# Patient Record
(demographics unavailable — no encounter records)

---

## 2024-11-18 NOTE — HISTORY OF PRESENT ILLNESS
[___ Weeks Post Op] : [unfilled] weeks post op [3] : the patient reports pain that is 3/10 in severity [Doing Well] : is doing well [No Sign of Infection] : is showing no signs of infection [Adequate Pain Control] : has adequate pain control [Chills] : no chills [Constipation] : no constipation [Diarrhea] : no diarrhea [Dysuria] : no dysuria [Fever] : no fever [Nausea] : no nausea [Vomiting] : no vomiting [de-identified] : POS: Right total knee arthroplasty. Computer-assisted tibial resection, [employer]. DOS: 11/05/2024 [de-identified] : Patient is walking with a cane he has 2 more sessions of a home physical therapy left he is taking oxycodone 5 mg 3 times a day his pain is controlled [de-identified] :  right knee  exam shows healing incision with no sign of infection, ROM 3-95 degree . The surgical incision site(s) swollen, but clean, dry and intact, healed, not erythematous and not dehisced. Additional findings included an unremarkable neurological exam, peripheral vascular exam normal and maneuvers demonstrated a negative Josue's sign.   [de-identified] :  3V xray of the right knee done in the office today and reviewed and demonstrates s/p implants in good positioning with no evidence of wear, loosening, or subsidence.   [de-identified] : Patient will start out pt physical therapy and low impact exercise. continue elevated, ice, and pain management.   Continue DVTP medication.  continue posterior hip precaution.  follow-up in  6 weeks

## 2025-01-15 NOTE — HISTORY OF PRESENT ILLNESS
[Chills] : no chills [Constipation] : no constipation [Diarrhea] : no diarrhea [Dysuria] : no dysuria [Fever] : no fever [Nausea] : no nausea [Vomiting] : no vomiting [de-identified] : POS: Right total knee arthroplasty. Computer-assisted tibial resection, [employer]. DOS: 11/05/2024. [de-identified] :  Right knee exam shows healing incision with no sign of infection, ROM 0 to 120 degree. The surgical incision site(s) swollen, but clean, dry and intact, healed, not erythematous and not dehisced. Additional findings included an unremarkable neurological exam, peripheral vascular exam normal and maneuvers demonstrated a negative Josue's sign.   [de-identified] :  3V xray of the right knee done in the office today and reviewed and demonstrates s/p implants in good positioning with no evidence of wear, loosening, or subsidence.   [de-identified] : He will complete outpatient physical therapy and stay with low impact exercise he requires to take antibiotic prior to dental work.  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

## 2025-01-15 NOTE — DISCUSSION/SUMMARY
[Medication Risks Reviewed] : Medication risks reviewed [Surgical risks reviewed] : Surgical risks reviewed [de-identified] : This is a 79 year old M pt presents today for 10 week postop of right knee arthroplasty done on 11/5/2024 and re-evaluation of left knee pain. Patient is doing well on the right knee. Patient should continue physical therapy and low impact exercises. Pt understands the importance of prophylaxis for invasive dental procedures. Script for bilateral knee physical therapy was provided today.   Regarding the left knee, he has a known hx of bone-on-bone lateral compartmental osteoarthritis. The nature of condition and treatment options were discussed in detail. Patient is a candidate for left TKA. Patient has scheduled to proceed with a left TKA on 04/10/2025. The surgery was discussed in detail including pre-op and post-op. The pt is having worse pain with walking, stairs, exercise, getting up from seated position. His quality of life is deteriorating. The pt has exhausted over 3 months of conservative treatment including IA injections, home therapy, PT, anti-inflammatories, Tylenol. I believe left TKA is reasonable. The pt will talk with the surgical coordinator to schedule a left TKA. All questions and concerns were answered. We will re-evaluate the patient in early April before the surgery.   so that without appointment is The patient is a 79 year old individual with end stage arthritis of their left knee joint. The patient has exhausted a minimum of 3 months conservative treatment including prior injections (cortisone and/or hyaluronic acid injections), physical therapy, over the counter NSAIDS and pain medication where indicated. In addition, the patient's quality of life is diminished due to significant chronic pain. The patient is having difficulty with activities of daily living, including ambulating, descending stairs, and rising from a seated position. Based upon the patients continued symptoms and failure to respond to conservative treatment, I have recommended a left total knee replacement for this patient. A long discussion took place with the patient describing what a total joint replacement is and what the procedure would entail. A knee model, similar to the implant that will be used during the operation, was utilized to demonstrate and to discuss the various bearing surfaces of the implants. Implant fixation, use of cement, was also discussed with the patient. Choices of implant manufacturers, mainly [employer] and [employer], were discussed and reviewed with preference to be made by patient and surgeon prior to operation. Final selection to be based on customary practice as well as preoperative templating with selection confirmed intraoperatively based on the patient's anatomy. The patient participated and agreed with the decision-making process. The hospitalization and post-operative care and rehabilitation were also discussed. The use of perioperative antibiotics and DVT prophylaxis were discussed. The risk, benefits and alternatives to a surgical intervention were discussed at length with the patient. The patient was also advised of risks related to the medical comorbidities and elevated body mass index (BMI). A lengthy discussion took place to review the most common complications including but not limited to: deep vein thrombosis, pulmonary embolism, heart attack, stroke, infection, wound breakdown, numbness, damage to nerves, tendon, muscles, arteries or other blood vessels, death and other possible complications from anesthesia. The patient was told that we will take steps to minimize these risks by using sterile technique, antibiotics and DVT prophylaxis when appropriate and follow the patient postoperatively in the office setting to monitor progress. The possibility of recurrent pain, no improvement in pain and actual worsening of pain were also discussed with the patient.   The discharge plan of care focused on the patient going home following surgery. The patient was encouraged to make the necessary arrangements to have someone stay with them when they are discharged home. Following discharge, a home care nurse will visit the patient. The home care nurse will open your home care case and request home physical therapy services. Home physical therapy will commence following discharge provided it is appropriate and covered by the health insurance benefit plan.   The benefits of surgery were discussed with the patient including the potential for improving his/her current clinical condition through operative intervention. Alternatives to surgical intervention including continued conservative management were also discussed in detail. All questions were answered to the satisfaction of the patient. The treatment plan of care, as well as a model of a total knee equivalent to the one that will be used for their total joint replacement, was shared with the patient. The patient participated and agreed to the plan of care as well as the use of the recommended implants for their total joint replacement surgery.

## 2025-01-15 NOTE — HISTORY OF PRESENT ILLNESS
[Chills] : no chills [Constipation] : no constipation [Diarrhea] : no diarrhea [Dysuria] : no dysuria [Fever] : no fever [Nausea] : no nausea [Vomiting] : no vomiting [de-identified] : POS: Right total knee arthroplasty. Computer-assisted tibial resection, [employer]. DOS: 11/05/2024. [de-identified] :  Right knee exam shows healing incision with no sign of infection, ROM 0 to 120 degree. The surgical incision site(s) swollen, but clean, dry and intact, healed, not erythematous and not dehisced. Additional findings included an unremarkable neurological exam, peripheral vascular exam normal and maneuvers demonstrated a negative Josue's sign.   [de-identified] :  3V xray of the right knee done in the office today and reviewed and demonstrates s/p implants in good positioning with no evidence of wear, loosening, or subsidence.   [de-identified] : He will complete outpatient physical therapy and stay with low impact exercise he requires to take antibiotic prior to dental work.  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

## 2025-01-15 NOTE — PHYSICAL EXAM
[Antalgic] : not antalgic [de-identified] : GENERAL APPEARANCE: Well nourished and hydrated, pleasant, alert, and oriented x 3. Appears their stated age.  HEENT: Normocephalic, extraocular eye motion intact. Nasal septum midline. Oral cavity clear. External auditory canal clear.  RESPIRATORY: Breath sounds clear and audible in all lobes. No wheezing, No accessory muscle use. CARDIOVASCULAR: No apparent abnormalities. No lower leg edema. No varicosities. Pedal pulses are palpable. NEUROLOGIC: Sensation is normal, no muscle weakness in the upper or lower extremities. DERMATOLOGIC: No apparent skin lesions, moist, warm, no rash. SPINE: Cervical spine appears normal and moves freely; thoracic spine appears normal and moves freely; lumbosacral spine appears normal and moves freely, normal, nontender. MUSCULOSKELETAL: Hands, wrists, and elbows are normal and move freely, shoulders are normal and move freely.  Musculoskeletal 5/5 motor strength in bilateral lower extremities. Sensory: Intact in bilateral lower extremities. DTRs: Biceps, brachioradialis, triceps, patellar, ankle and plantar 2+ and symmetric bilaterally. Pulses: dorsalis pedis, posterior tibial, femoral, popliteal, and radial 2+ and symmetric bilaterally.  Constitutional: Alert and in no acute distress, but well-appearing.   [de-identified] : Right knee examination shows healed incision he has 0 to 120 degree range of motion without pain there is mild swelling that is normal Left knee examination shows mild valgus alignment lateral joint line tenderness range of motion 0 to 120 degree [de-identified] : 3V xray of the right knee done in office today and reviewed by Dr. Clark Becerra demonstrates s/p right knee implants in good positioning with no evidence of wear, loosening, or subsidence.

## 2025-01-15 NOTE — DISCUSSION/SUMMARY
[Medication Risks Reviewed] : Medication risks reviewed [Surgical risks reviewed] : Surgical risks reviewed [de-identified] : This is a 79 year old M pt presents today for 10 week postop of right knee arthroplasty done on 11/5/2024 and re-evaluation of left knee pain. Patient is doing well on the right knee. Patient should continue physical therapy and low impact exercises. Pt understands the importance of prophylaxis for invasive dental procedures. Script for bilateral knee physical therapy was provided today.   Regarding the left knee, he has a known hx of bone-on-bone lateral compartmental osteoarthritis. The nature of condition and treatment options were discussed in detail. Patient is a candidate for left TKA. Patient has scheduled to proceed with a left TKA on 04/10/2025. The surgery was discussed in detail including pre-op and post-op. The pt is having worse pain with walking, stairs, exercise, getting up from seated position. His quality of life is deteriorating. The pt has exhausted over 3 months of conservative treatment including IA injections, home therapy, PT, anti-inflammatories, Tylenol. I believe left TKA is reasonable. The pt will talk with the surgical coordinator to schedule a left TKA. All questions and concerns were answered. We will re-evaluate the patient in early April before the surgery.   so that without appointment is The patient is a 79 year old individual with end stage arthritis of their left knee joint. The patient has exhausted a minimum of 3 months conservative treatment including prior injections (cortisone and/or hyaluronic acid injections), physical therapy, over the counter NSAIDS and pain medication where indicated. In addition, the patient's quality of life is diminished due to significant chronic pain. The patient is having difficulty with activities of daily living, including ambulating, descending stairs, and rising from a seated position. Based upon the patients continued symptoms and failure to respond to conservative treatment, I have recommended a left total knee replacement for this patient. A long discussion took place with the patient describing what a total joint replacement is and what the procedure would entail. A knee model, similar to the implant that will be used during the operation, was utilized to demonstrate and to discuss the various bearing surfaces of the implants. Implant fixation, use of cement, was also discussed with the patient. Choices of implant manufacturers, mainly [employer] and [employer], were discussed and reviewed with preference to be made by patient and surgeon prior to operation. Final selection to be based on customary practice as well as preoperative templating with selection confirmed intraoperatively based on the patient's anatomy. The patient participated and agreed with the decision-making process. The hospitalization and post-operative care and rehabilitation were also discussed. The use of perioperative antibiotics and DVT prophylaxis were discussed. The risk, benefits and alternatives to a surgical intervention were discussed at length with the patient. The patient was also advised of risks related to the medical comorbidities and elevated body mass index (BMI). A lengthy discussion took place to review the most common complications including but not limited to: deep vein thrombosis, pulmonary embolism, heart attack, stroke, infection, wound breakdown, numbness, damage to nerves, tendon, muscles, arteries or other blood vessels, death and other possible complications from anesthesia. The patient was told that we will take steps to minimize these risks by using sterile technique, antibiotics and DVT prophylaxis when appropriate and follow the patient postoperatively in the office setting to monitor progress. The possibility of recurrent pain, no improvement in pain and actual worsening of pain were also discussed with the patient.   The discharge plan of care focused on the patient going home following surgery. The patient was encouraged to make the necessary arrangements to have someone stay with them when they are discharged home. Following discharge, a home care nurse will visit the patient. The home care nurse will open your home care case and request home physical therapy services. Home physical therapy will commence following discharge provided it is appropriate and covered by the health insurance benefit plan.   The benefits of surgery were discussed with the patient including the potential for improving his/her current clinical condition through operative intervention. Alternatives to surgical intervention including continued conservative management were also discussed in detail. All questions were answered to the satisfaction of the patient. The treatment plan of care, as well as a model of a total knee equivalent to the one that will be used for their total joint replacement, was shared with the patient. The patient participated and agreed to the plan of care as well as the use of the recommended implants for their total joint replacement surgery.

## 2025-01-15 NOTE — PHYSICAL EXAM
[Antalgic] : not antalgic [de-identified] : GENERAL APPEARANCE: Well nourished and hydrated, pleasant, alert, and oriented x 3. Appears their stated age.  HEENT: Normocephalic, extraocular eye motion intact. Nasal septum midline. Oral cavity clear. External auditory canal clear.  RESPIRATORY: Breath sounds clear and audible in all lobes. No wheezing, No accessory muscle use. CARDIOVASCULAR: No apparent abnormalities. No lower leg edema. No varicosities. Pedal pulses are palpable. NEUROLOGIC: Sensation is normal, no muscle weakness in the upper or lower extremities. DERMATOLOGIC: No apparent skin lesions, moist, warm, no rash. SPINE: Cervical spine appears normal and moves freely; thoracic spine appears normal and moves freely; lumbosacral spine appears normal and moves freely, normal, nontender. MUSCULOSKELETAL: Hands, wrists, and elbows are normal and move freely, shoulders are normal and move freely.  Musculoskeletal 5/5 motor strength in bilateral lower extremities. Sensory: Intact in bilateral lower extremities. DTRs: Biceps, brachioradialis, triceps, patellar, ankle and plantar 2+ and symmetric bilaterally. Pulses: dorsalis pedis, posterior tibial, femoral, popliteal, and radial 2+ and symmetric bilaterally.  Constitutional: Alert and in no acute distress, but well-appearing.   [de-identified] : Right knee examination shows healed incision he has 0 to 120 degree range of motion without pain there is mild swelling that is normal Left knee examination shows mild valgus alignment lateral joint line tenderness range of motion 0 to 120 degree [de-identified] : 3V xray of the right knee done in office today and reviewed by Dr. Clark Becerra demonstrates s/p right knee implants in good positioning with no evidence of wear, loosening, or subsidence.

## 2025-01-15 NOTE — END OF VISIT
[FreeTextEntry3] : Documented by Mica Aguilar acting solely as a scribe for Dr. Clark Becerra on this date 01/15/2025.   All Medical record entries made by the Scribe were at my, Dr. Clark Becerra's, direction and personally dictated by me on 01/15/2025. I have reviewed the chart and agree that the record accurately reflects my personal performance of the history, physical exam, assessment and plan. I have also personally directed, reviewed, and agreed with the discharge instructions.

## 2025-03-26 NOTE — END OF VISIT
[FreeTextEntry3] : Documented by Mica Aguilar acting solely as a scribe for Dr. Clark Becerra on this date 03/26/2025.   All Medical record entries made by the Scribe were at my, Dr. Clark Becerra's, direction and personally dictated by me on 03/26/2025. I have reviewed the chart and agree that the record accurately reflects my personal performance of the history, physical exam, assessment and plan. I have also personally directed, reviewed, and agreed with the discharge instructions.

## 2025-03-26 NOTE — REASON FOR VISIT
[Follow-Up Visit] : a follow-up visit for [Other: ____] : [unfilled] [FreeTextEntry2] : Status post right total knee arthroplasty on November 5, 2024/left knee pain

## 2025-03-26 NOTE — HISTORY OF PRESENT ILLNESS
[Pain Location] : pain [Worsening] : worsening [___ yrs] : [unfilled] year(s) ago [7] : a current pain level of 7/10 [Walking] : worsened by walking [Knee Flexion] : worsened with knee flexion [de-identified] : Patient is 79-year-old male here for follow-up of left knee pain.  He is status post right knee replacement November 2024 and reports his right knee is doing very well.  He is scheduled for left knee replacement in 1 week.  He continues to have constant worsening left knee pain worse with ambulation stairs and exercise.  He has tried conservative management including cortisone injections gel injections over-the-counter NSAIDs and Tylenol for pain with minimal relief.  He has past medical history of diabetes well-controlled

## 2025-03-26 NOTE — DISCUSSION/SUMMARY
[Medication Risks Reviewed] : Medication risks reviewed [Surgical risks reviewed] : Surgical risks reviewed [de-identified] : This is a 79 year old M pt presents today with a known hx of bone-on-bone lateral compartmental osteoarthritis of the left knee. Updated x-ray done today. The nature of condition and treatment options were discussed in detail. Patient is a candidate for left TKA. The surgery was discussed in detail including pre-op and post-op. The pt is having worse pain with walking, stairs, exercise, getting up from seated position. His quality of life is deteriorating. The pt has exhausted over 3 months of conservative treatment including IA injections, home therapy, PT, anti-inflammatories, Tylenol. Patient has scheduled to proceed with a left TKA on 04/3/2025. All questions and concerns were answered.   The patient is a 79 year old individual with end stage arthritis of their left knee joint. The patient has exhausted a minimum of 3 months conservative treatment including prior injections (cortisone and/or hyaluronic acid injections), physical therapy, over the counter NSAIDS and pain medication where indicated. In addition, the patient's quality of life is diminished due to significant chronic pain. The patient is having difficulty with activities of daily living, including ambulating, descending stairs, and rising from a seated position. Based upon the patients continued symptoms and failure to respond to conservative treatment, I have recommended a left total knee replacement for this patient. A long discussion took place with the patient describing what a total joint replacement is and what the procedure would entail. A knee model, similar to the implant that will be used during the operation, was utilized to demonstrate and to discuss the various bearing surfaces of the implants. Implant fixation, use of cement, was also discussed with the patient. Choices of implant manufacturers, mainly [employer] and [employer], were discussed and reviewed with preference to be made by patient and surgeon prior to operation. Final selection to be based on customary practice as well as preoperative templating with selection confirmed intraoperatively based on the patient's anatomy. The patient participated and agreed with the decision-making process. The hospitalization and post-operative care and rehabilitation were also discussed. The use of perioperative antibiotics and DVT prophylaxis were discussed. The risk, benefits and alternatives to a surgical intervention were discussed at length with the patient. The patient was also advised of risks related to the medical comorbidities and elevated body mass index (BMI). A lengthy discussion took place to review the most common complications including but not limited to: deep vein thrombosis, pulmonary embolism, heart attack, stroke, infection, wound breakdown, numbness, damage to nerves, tendon, muscles, arteries or other blood vessels, death and other possible complications from anesthesia. The patient was told that we will take steps to minimize these risks by using sterile technique, antibiotics and DVT prophylaxis when appropriate and follow the patient postoperatively in the office setting to monitor progress. The possibility of recurrent pain, no improvement in pain and actual worsening of pain were also discussed with the patient.   The discharge plan of care focused on the patient going home following surgery. The patient was encouraged to make the necessary arrangements to have someone stay with them when they are discharged home. Following discharge, a home care nurse will visit the patient. The home care nurse will open your home care case and request home physical therapy services. Home physical therapy will commence following discharge provided it is appropriate and covered by the health insurance benefit plan.   The benefits of surgery were discussed with the patient including the potential for improving his/her current clinical condition through operative intervention. Alternatives to surgical intervention including continued conservative management were also discussed in detail. All questions were answered to the satisfaction of the patient. The treatment plan of care, as well as a model of a total knee equivalent to the one that will be used for their total joint replacement, was shared with the patient. The patient participated and agreed to the plan of care as well as the use of the recommended implants for their total joint replacement surgery.

## 2025-03-26 NOTE — PHYSICAL EXAM
[Antalgic] : not antalgic [de-identified] : GENERAL APPEARANCE: Well nourished and hydrated, pleasant, alert, and oriented x 3. Appears their stated age.  HEENT: Normocephalic, extraocular eye motion intact. Nasal septum midline. Oral cavity clear. External auditory canal clear.  RESPIRATORY: Breath sounds clear and audible in all lobes. No wheezing, No accessory muscle use. CARDIOVASCULAR: No apparent abnormalities. No lower leg edema. No varicosities. Pedal pulses are palpable. NEUROLOGIC: Sensation is normal, no muscle weakness in the upper or lower extremities. DERMATOLOGIC: No apparent skin lesions, moist, warm, no rash. SPINE: Cervical spine appears normal and moves freely; thoracic spine appears normal and moves freely; lumbosacral spine appears normal and moves freely, normal, nontender. MUSCULOSKELETAL: Hands, wrists, and elbows are normal and move freely, shoulders are normal and move freely.  Musculoskeletal 5/5 motor strength in bilateral lower extremities. Sensory: Intact in bilateral lower extremities. DTRs: Biceps, brachioradialis, triceps, patellar, ankle and plantar 2+ and symmetric bilaterally. Pulses: dorsalis pedis, posterior tibial, femoral, popliteal, and radial 2+ and symmetric bilaterally.  Constitutional: Alert and in no acute distress, but well-appearing.   [de-identified] :  Left knee examination shows mild valgus alignment lateral joint line tenderness range of motion 0 to 120 degree [de-identified] : 4V xray of the left knee done in the office today and reviewed by Dr. Clark Becerra demonstrates bone-on-bone lateral compartmental osteoarthritis.

## 2025-04-24 NOTE — HISTORY OF PRESENT ILLNESS
[de-identified] : s/p Left total knee arthroplasty. 4/3/25  [de-identified] : Patient is 3 weeks postop left knee replacement.  He reports overall he is doing well.  He has minimal pain in the knee.  He is ambulating with a cane takes Tylenol for pain as needed he started outpatient physical therapy this week [de-identified] : Left knee exam well-healed incision no sign of infection range of motion 0/100 [de-identified] : Three-view imaging of the left knee shows well-fixed implants no subsidence loosening or wear [de-identified] : Postoperatively, the patient is doing well, has excellent pain control and is showing no signs of infection. [de-identified] : Patient will continue with the physical therapy and low impact exercise. I discussed use of antibiotic before dental work for 2 years. follow-up in 1 month.

## 2025-05-16 NOTE — HISTORY OF PRESENT ILLNESS
[de-identified] : s/p Left total knee arthroplasty. 4/3/25. [de-identified] : Patient is 6-week postop left total knee replacement.  He comes in today for wound check.  He reports he had been doing very well this morning noticed a couple of areas of drainage from his knee no fevers chills nausea vomiting no increased pain in the knee.  He is doing well with physical therapy has minimal pain [de-identified] : Left knee exam 3 suture abscesses along the incision without active drainage.  Range of motion 0/120 [de-identified] : Patient comes in today for wound check is 6 weeks postop left total knee replacement.  He does have 3 small suture abscesses with some surrounding redness.  I provided Keflex prescription for 10 days.  He will follow-up in 1 week for wound check we did discuss if he has acutely worsening symptoms over the weekend such as fevers chills nausea vomiting drainage from the wound or increased pain he should go to the emergency room

## 2025-05-29 NOTE — HISTORY OF PRESENT ILLNESS
[de-identified] : Left total knee arthroplasty. 4/3/25. [de-identified] : Patient comes in today for wound check.  He did complete his course of Keflex.  He does continue to have some suture abscesses which some have scabbed.  He reports no active drainage from the wound no fevers chills nausea vomiting no increased pain [de-identified] : Left knee exam numerous scabs along the incision with mild surrounding erythema no active drainage [de-identified] : I sent a course of Bactrim and patient will follow-up in 1 week for wound check

## 2025-06-04 NOTE — HISTORY OF PRESENT ILLNESS
[de-identified] : Left total knee arthroplasty. 4/3/25. [de-identified] : Patient is status post left knee replacement 4/3/2025.  He comes in today for wound check.  He has been on Bactrim since Thursday.  He reports wound is continuing to heal.  He denies any active drainage no fevers chills nausea vomiting no increased pain in the knee [de-identified] : Left knee exam numerous healing suture abscess along the incision.  No active drainage [de-identified] : Patient will continue Bactrim and complete the course.  Continue low impact exercises.  The patient superficial wound infection has improved.  We cleaned the area again today.  I applied a local dressing.  Will follow-up with him as scheduled in approximately 10 days.  There is no sign of deep periprosthetic infection